# Patient Record
Sex: MALE | Race: WHITE
[De-identification: names, ages, dates, MRNs, and addresses within clinical notes are randomized per-mention and may not be internally consistent; named-entity substitution may affect disease eponyms.]

---

## 2020-01-31 ENCOUNTER — HOSPITAL ENCOUNTER (OUTPATIENT)
Dept: HOSPITAL 74 - FASU | Age: 55
Discharge: HOME | End: 2020-01-31
Attending: ORTHOPAEDIC SURGERY
Payer: COMMERCIAL

## 2020-01-31 VITALS — TEMPERATURE: 97.9 F | HEART RATE: 84 BPM | SYSTOLIC BLOOD PRESSURE: 125 MMHG | DIASTOLIC BLOOD PRESSURE: 80 MMHG

## 2020-01-31 VITALS — BODY MASS INDEX: 23.6 KG/M2
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
## 2020-01-31 DIAGNOSIS — S46.211A: Primary | ICD-10-CM
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
## 2020-01-31 PROCEDURE — 0LM40ZZ REATTACHMENT OF LEFT UPPER ARM TENDON, OPEN APPROACH: ICD-10-PCS | Performed by: ORTHOPAEDIC SURGERY

## 2020-01-31 NOTE — OP
DATE OF OPERATION:  01/31/2020

 

Done at [facility] 

 

SURGEON:  Chavez Aguilar MD 

 

ASSISTANT:  YUMIKO Moreno

 

PREOPERATIVE DIAGNOSIS:  Left elbow distal biceps rupture.

 

PROCEDURE:  Repair/re-insertion left distal biceps.

 

FINDINGS:  Avulsed biceps tendon split lengthwise. 

 

DESCRIPTION OF PROCEDURE:  Informed consent was obtained.  Patient was taken to the

operating room where the left upper extremity was prepped and draped in a sterile

fashion.  Tourniquet was placed on the upper arm and inflated to 250 mmHg. 

Horizontal incision was made 2 cm distal to the elbow crease.  Fascia was incised. 

There was noted to be bruising in the area, and the biceps tendon was identified.  It

was noted to be split along its length.  This was then sewn together using No. 2

FiberWires creating 1 thicker tendon.  

 

Two interlocking Bandy stitches were placed through the combined biceps tendon and

secured to a ToggleLoc suture with a metal implant.  The radial neck was identified. 

It was cleared of soft tissue.  With the arm supinated, a ToggleLoc was placed after

a 7-mm hole was drilled through the proximal cortex and a 4 mm through the distal

cortex.  ToggleLoc was inserted through both cortices, turned 90 degrees, and locked

on the posterior cortex.  Tensioning the biceps allowed for insertion of the biceps

tendon into the 7-mm diameter insertion.  

 

This was then tied and held in place.  Wound was irrigated with copious amounts of

irrigation.  The tourniquet was released.  There was no evidence of active arterial

bleeding.  Later, a closure of 2-0 Vicryl and 3-0 Prolene was performed.  Sterile

dressing and splint were placed.  Patient was transferred to recovery without

complication.

 

The PA listed above was present and assisted at surgery.  Their presence was

absolutely medically necessary for the completion of the procedure.  They helped hold

the arthroscopy, pass instruments (and implants when indicated) and the procedure

could not have been completed without their assistance.

 

 

CHAVEZ AGUILAR M.D.

 

[id number]

DD: 01/31/2020 09:37

DT: 01/31/2020 10:59

Job #:  [id number]